# Patient Record
Sex: MALE | Race: WHITE | NOT HISPANIC OR LATINO | Employment: OTHER | URBAN - METROPOLITAN AREA
[De-identification: names, ages, dates, MRNs, and addresses within clinical notes are randomized per-mention and may not be internally consistent; named-entity substitution may affect disease eponyms.]

---

## 2021-11-10 ENCOUNTER — APPOINTMENT (OUTPATIENT)
Dept: URGENT CARE | Facility: CLINIC | Age: 81
End: 2021-11-10
Payer: COMMERCIAL

## 2021-11-11 ENCOUNTER — HOSPITAL ENCOUNTER (OUTPATIENT)
Dept: RADIOLOGY | Facility: MEDICAL CENTER | Age: 81
End: 2021-11-11
Attending: NURSE PRACTITIONER
Payer: MEDICARE

## 2021-11-11 ENCOUNTER — OFFICE VISIT (OUTPATIENT)
Dept: URGENT CARE | Facility: CLINIC | Age: 81
End: 2021-11-11
Payer: MEDICARE

## 2021-11-11 VITALS
HEART RATE: 110 BPM | BODY MASS INDEX: 25.95 KG/M2 | TEMPERATURE: 97.6 F | RESPIRATION RATE: 16 BRPM | OXYGEN SATURATION: 97 % | DIASTOLIC BLOOD PRESSURE: 82 MMHG | WEIGHT: 191.6 LBS | SYSTOLIC BLOOD PRESSURE: 144 MMHG | HEIGHT: 72 IN

## 2021-11-11 DIAGNOSIS — R10.31 RIGHT INGUINAL PAIN: ICD-10-CM

## 2021-11-11 PROBLEM — E78.2 MIXED HYPERLIPIDEMIA: Status: ACTIVE | Noted: 2019-04-04

## 2021-11-11 PROBLEM — M54.17 RIGHT LUMBOSACRAL RADICULOPATHY: Status: ACTIVE | Noted: 2019-09-05

## 2021-11-11 PROBLEM — R19.5 POSITIVE COLORECTAL CANCER SCREENING USING COLOGUARD TEST: Status: ACTIVE | Noted: 2021-02-17

## 2021-11-11 PROBLEM — G60.8 PERIPHERAL SENSORY-MOTOR AXONAL POLYNEUROPATHY: Status: ACTIVE | Noted: 2019-09-05

## 2021-11-11 PROBLEM — I82.5Z9 CHRONIC DEEP VEIN THROMBOSIS (DVT) OF DISTAL VEIN OF LOWER EXTREMITY (HCC): Status: ACTIVE | Noted: 2019-04-04

## 2021-11-11 PROBLEM — N21.0 BLADDER CALCULUS: Status: ACTIVE | Noted: 2021-05-17

## 2021-11-11 LAB
APPEARANCE UR: CLEAR
BILIRUB UR STRIP-MCNC: NEGATIVE MG/DL
COLOR UR AUTO: YELLOW
GLUCOSE UR STRIP.AUTO-MCNC: NEGATIVE MG/DL
KETONES UR STRIP.AUTO-MCNC: NEGATIVE MG/DL
LEUKOCYTE ESTERASE UR QL STRIP.AUTO: NEGATIVE
NITRITE UR QL STRIP.AUTO: NEGATIVE
PH UR STRIP.AUTO: 5 [PH] (ref 5–8)
PROT UR QL STRIP: NEGATIVE MG/DL
RBC UR QL AUTO: NORMAL
SP GR UR STRIP.AUTO: 1.03
UROBILINOGEN UR STRIP-MCNC: 0.2 MG/DL

## 2021-11-11 PROCEDURE — 76775 US EXAM ABDO BACK WALL LIM: CPT

## 2021-11-11 PROCEDURE — 76857 US EXAM PELVIC LIMITED: CPT

## 2021-11-11 PROCEDURE — 99203 OFFICE O/P NEW LOW 30 MIN: CPT | Performed by: NURSE PRACTITIONER

## 2021-11-11 PROCEDURE — 81002 URINALYSIS NONAUTO W/O SCOPE: CPT | Performed by: NURSE PRACTITIONER

## 2021-11-11 PROCEDURE — 76870 US EXAM SCROTUM: CPT

## 2021-11-11 RX ORDER — OMEPRAZOLE 10 MG/1
10 CAPSULE, DELAYED RELEASE ORAL
COMMUNITY
End: 2021-11-23

## 2021-11-11 RX ORDER — APIXABAN 2.5 MG/1
2.5 TABLET, FILM COATED ORAL 2 TIMES DAILY
COMMUNITY
Start: 2021-08-25

## 2021-11-11 RX ORDER — PITAVASTATIN CALCIUM 4.18 MG/1
4 TABLET, FILM COATED ORAL DAILY
COMMUNITY

## 2021-11-11 RX ORDER — TAMSULOSIN HYDROCHLORIDE 0.4 MG/1
0.4 CAPSULE ORAL EVERY EVENING
COMMUNITY

## 2021-11-11 NOTE — PROGRESS NOTES
Chief Complaint   Patient presents with   • Groin Pain     (R) side, x1 month        HISTORY OF PRESENT ILLNESS: Patient is a pleasant 81 y.o. male with a history of inguinal hernia, bladder calculus, and kidney stone who presents to urgent care today with complaints of right groin pain for the past 2 months.  The pain occurs with activity, improves with rest.  He denies any urinary symptoms, fever, chills, malaise, testicular pain or swelling, changes in his bowel movements.  He describes the pain is similar to his previous inguinal hernia.  Reports surgical repair in mid 1990s for such.  He has not tried any medication for symptom relief as rest typically improves symptoms.  He is visiting from [place], will be here for approximately 1 month.    Patient Active Problem List    Diagnosis Date Noted   • Bladder calculus 05/17/2021   • Positive colorectal cancer screening using Cologuard test 02/17/2021   • Right lumbosacral radiculopathy 09/05/2019   • Peripheral sensory-motor axonal polyneuropathy 09/05/2019   • Chronic deep vein thrombosis (DVT) of distal vein of lower extremity ([facility]) 04/04/2019   • Mixed hyperlipidemia 04/04/2019   • Gastrocnemius muscle strain, left, initial encounter 05/18/2016   • BPH (benign prostatic hyperplasia) 01/16/2016   • DVT, recurrent, lower extremity, chronic 01/16/2016   • Hyperlipidemia 01/16/2016   • DVT (deep venous thrombosis) ([facility]) 07/10/2014   • Hypercholesteremia 07/10/2014   • Intestinal metaplasia of gastric mucosa 07/10/2014   • Calculus of kidney 05/08/2014   • 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     •  Pitavastatin Calcium 4 MG Tab Take 2 mg by mouth.     • Multiple Vitamin (MULTI-VITAMIN) Tab Take 1 Tablet by mouth.     • omeprazole (PRILOSEC) 10 MG CAPSULE DELAYED RELEASE Take 10 mg by mouth.       No current Epic-ordered facility-administered medications on file.       History reviewed. No pertinent past medical history.    Social History     Tobacco Use   • Smoking status: Not on file   • Smokeless tobacco: Not on file   Substance Use Topics   • Alcohol use: Not on file   • Drug use: Not on file       No family status information on file.   History reviewed. No pertinent family history.    ROS:  Review of Systems   Constitutional: Negative for fever, chills, weight loss, malaise, and fatigue.   HENT: Negative for ear pain, nosebleeds, congestion, sore throat and neck pain.    Eyes: Negative for vision changes.   Neuro: Negative for headache, sensory changes, weakness, seizure, LOC.   Cardiovascular: Negative for chest pain, palpitations, orthopnea and leg swelling.   Respiratory: Negative for cough, sputum production, shortness of breath and wheezing.   Gastrointestinal: Negative for abdominal pain, nausea, vomiting or diarrhea.   Genitourinary: Positive for groin pain.  Negative for dysuria, urgency and frequency.  Musculoskeletal: Negative for falls, neck pain, back pain, joint pain, myalgias.   Skin: Negative for rash, diaphoresis.     Exam:  /82 (BP Location: Right arm, Patient Position: Sitting, BP Cuff Size: Adult long)   Pulse (!) 110   Temp 36.4 °C (97.6 °F) (Temporal)   Resp 16   Ht 1.829 m (6')   Wt 86.9 kg (191 lb 9.6 oz)   SpO2 97%   General: well-nourished, well-developed male in NAD  Head: normocephalic, atraumatic  Eyes: PERRLA, no conjunctival injection, acuity grossly intact, lids normal.  Ears: normal shape and symmetry, no tenderness, no discharge. External canals are without any significant edema or erythema. Tympanic membranes are without any inflammation, no effusion. Gross  auditory acuity is intact.  Nose: symmetrical without tenderness, no discharge.  Mouth/Throat: reasonable hygiene, no erythema, exudates or tonsillar enlargement.  Neck: no masses, range of motion within normal limits, no tracheal deviation. No obvious thyroid enlargement.   Lymph: no cervical adenopathy. No supraclavicular adenopathy.   Neuro: alert and oriented. Cranial nerves 1-12 grossly intact. No sensory deficit.   Cardiovascular: regular rate and rhythm. No edema.  Pulmonary: no distress. Chest is symmetrical with respiration, no wheezes, crackles, or rhonchi.   Abdomen: soft, non-tender, no guarding, no hepatosplenomegaly.  No groin tenderness.  Musculoskeletal: no clubbing, appropriate muscle tone, gait is stable.  Skin: warm, dry, intact, no clubbing, no cyanosis, no rashes.   Psych: appropriate mood, affect, judgement.     POC urine positive for blood      Assessment/Plan:  1. Right inguinal pain  POCT Urinalysis    US-RENAL    US-INGUINAL HERNIA    [id number]       Patient presents with ongoing right groin pain.  Urine is positive for blood, patient notes that this is somewhat chronic for him.  I have ordered a renal, hernia, and scrotal ultrasound and patient, will call with results.  May use OTC Tylenol if needed.  Supportive care, differential diagnoses, and indications for immediate follow-up discussed with patient.   Pathogenesis of diagnosis discussed including typical length and natural progression.   Instructed to return to clinic or nearest emergency department for any change in condition, further concerns, or worsening of symptoms.  Patient states understanding of the plan of care and discharge instructions.  Instructed to make an appointment, for follow up, with his primary care provider.        Please note that this dictation was created using voice recognition software. I have made every reasonable attempt to correct obvious errors, but I expect that there are errors of grammar and  possibly content that I did not discover before finalizing the note.      NEIDA Castro.

## 2021-11-12 DIAGNOSIS — K40.90 UNILATERAL INGUINAL HERNIA WITHOUT OBSTRUCTION OR GANGRENE, RECURRENCE NOT SPECIFIED: ICD-10-CM

## 2021-11-23 ENCOUNTER — PRE-ADMISSION TESTING (OUTPATIENT)
Dept: ADMISSIONS | Facility: MEDICAL CENTER | Age: 81
End: 2021-11-23
Attending: COLON & RECTAL SURGERY
Payer: MEDICARE

## 2021-11-23 DIAGNOSIS — Z01.812 PRE-OPERATIVE LABORATORY EXAMINATION: ICD-10-CM

## 2021-11-23 DIAGNOSIS — Z01.810 PRE-OPERATIVE CARDIOVASCULAR EXAMINATION: ICD-10-CM

## 2021-11-23 LAB
ANION GAP SERPL CALC-SCNC: 10 MMOL/L (ref 7–16)
BUN SERPL-MCNC: 25 MG/DL (ref 8–22)
CALCIUM SERPL-MCNC: 9.3 MG/DL (ref 8.5–10.5)
CHLORIDE SERPL-SCNC: 105 MMOL/L (ref 96–112)
CO2 SERPL-SCNC: 26 MMOL/L (ref 20–33)
CREAT SERPL-MCNC: 0.96 MG/DL (ref 0.5–1.4)
EKG IMPRESSION: NORMAL
ERYTHROCYTE [DISTWIDTH] IN BLOOD BY AUTOMATED COUNT: 43 FL (ref 35.9–50)
GLUCOSE SERPL-MCNC: 88 MG/DL (ref 65–99)
HCT VFR BLD AUTO: 45.3 % (ref 42–52)
HGB BLD-MCNC: 15.3 G/DL (ref 14–18)
MCH RBC QN AUTO: 31.5 PG (ref 27–33)
MCHC RBC AUTO-ENTMCNC: 33.8 G/DL (ref 33.7–35.3)
MCV RBC AUTO: 93.4 FL (ref 81.4–97.8)
PLATELET # BLD AUTO: 204 K/UL (ref 164–446)
PMV BLD AUTO: 8.7 FL (ref 9–12.9)
POTASSIUM SERPL-SCNC: 4.5 MMOL/L (ref 3.6–5.5)
RBC # BLD AUTO: 4.85 M/UL (ref 4.7–6.1)
SARS-COV+SARS-COV-2 AG RESP QL IA.RAPID: NOTDETECTED
SODIUM SERPL-SCNC: 141 MMOL/L (ref 135–145)
SPECIMEN SOURCE: NORMAL
WBC # BLD AUTO: 8.8 K/UL (ref 4.8–10.8)

## 2021-11-23 PROCEDURE — 80048 BASIC METABOLIC PNL TOTAL CA: CPT

## 2021-11-23 PROCEDURE — 93010 ELECTROCARDIOGRAM REPORT: CPT | Performed by: INTERNAL MEDICINE

## 2021-11-23 PROCEDURE — 93005 ELECTROCARDIOGRAM TRACING: CPT

## 2021-11-23 PROCEDURE — 87426 SARSCOV CORONAVIRUS AG IA: CPT

## 2021-11-23 PROCEDURE — 36415 COLL VENOUS BLD VENIPUNCTURE: CPT

## 2021-11-23 PROCEDURE — 85027 COMPLETE CBC AUTOMATED: CPT

## 2021-11-24 ENCOUNTER — ANESTHESIA EVENT (OUTPATIENT)
Dept: SURGERY | Facility: MEDICAL CENTER | Age: 81
End: 2021-11-24
Payer: MEDICARE

## 2021-11-24 ENCOUNTER — HOSPITAL ENCOUNTER (OUTPATIENT)
Facility: MEDICAL CENTER | Age: 81
End: 2021-11-24
Attending: COLON & RECTAL SURGERY | Admitting: COLON & RECTAL SURGERY
Payer: MEDICARE

## 2021-11-24 ENCOUNTER — ANESTHESIA (OUTPATIENT)
Dept: SURGERY | Facility: MEDICAL CENTER | Age: 81
End: 2021-11-24
Payer: MEDICARE

## 2021-11-24 VITALS
HEART RATE: 76 BPM | SYSTOLIC BLOOD PRESSURE: 135 MMHG | RESPIRATION RATE: 20 BRPM | DIASTOLIC BLOOD PRESSURE: 97 MMHG | TEMPERATURE: 96.7 F | OXYGEN SATURATION: 92 % | BODY MASS INDEX: 25.29 KG/M2 | WEIGHT: 186.73 LBS | HEIGHT: 72 IN

## 2021-11-24 DIAGNOSIS — Z87.19 H/O INGUINAL HERNIA REPAIR: ICD-10-CM

## 2021-11-24 DIAGNOSIS — G89.18 POST-OPERATIVE PAIN: ICD-10-CM

## 2021-11-24 DIAGNOSIS — Z98.890 H/O INGUINAL HERNIA REPAIR: ICD-10-CM

## 2021-11-24 PROCEDURE — C1781 MESH (IMPLANTABLE): HCPCS | Performed by: COLON & RECTAL SURGERY

## 2021-11-24 PROCEDURE — 501568 HCHG TROCAR, BLUNTPORT 12MM: Performed by: COLON & RECTAL SURGERY

## 2021-11-24 PROCEDURE — 160002 HCHG RECOVERY MINUTES (STAT): Performed by: COLON & RECTAL SURGERY

## 2021-11-24 PROCEDURE — 160035 HCHG PACU - 1ST 60 MINS PHASE I: Performed by: COLON & RECTAL SURGERY

## 2021-11-24 PROCEDURE — 501570 HCHG TROCAR, SEPARATOR: Performed by: COLON & RECTAL SURGERY

## 2021-11-24 PROCEDURE — 501838 HCHG SUTURE GENERAL: Performed by: COLON & RECTAL SURGERY

## 2021-11-24 PROCEDURE — 501338 HCHG SHEARS, ENDO: Performed by: COLON & RECTAL SURGERY

## 2021-11-24 PROCEDURE — 700105 HCHG RX REV CODE 258: Performed by: COLON & RECTAL SURGERY

## 2021-11-24 PROCEDURE — 700111 HCHG RX REV CODE 636 W/ 250 OVERRIDE (IP): Performed by: ANESTHESIOLOGY

## 2021-11-24 PROCEDURE — 160046 HCHG PACU - 1ST 60 MINS PHASE II: Performed by: COLON & RECTAL SURGERY

## 2021-11-24 PROCEDURE — 700111 HCHG RX REV CODE 636 W/ 250 OVERRIDE (IP): Performed by: COLON & RECTAL SURGERY

## 2021-11-24 PROCEDURE — 160048 HCHG OR STATISTICAL LEVEL 1-5: Performed by: COLON & RECTAL SURGERY

## 2021-11-24 PROCEDURE — 502240 HCHG MISC OR SUPPLY RC 0272: Performed by: COLON & RECTAL SURGERY

## 2021-11-24 PROCEDURE — 502571 HCHG PACK, LAP CHOLE: Performed by: COLON & RECTAL SURGERY

## 2021-11-24 PROCEDURE — 160025 RECOVERY II MINUTES (STATS): Performed by: COLON & RECTAL SURGERY

## 2021-11-24 PROCEDURE — 160029 HCHG SURGERY MINUTES - 1ST 30 MINS LEVEL 4: Performed by: COLON & RECTAL SURGERY

## 2021-11-24 PROCEDURE — 160036 HCHG PACU - EA ADDL 30 MINS PHASE I: Performed by: COLON & RECTAL SURGERY

## 2021-11-24 PROCEDURE — 160009 HCHG ANES TIME/MIN: Performed by: COLON & RECTAL SURGERY

## 2021-11-24 PROCEDURE — 501583 HCHG TROCAR, THRD CAN&SEAL 5X100: Performed by: COLON & RECTAL SURGERY

## 2021-11-24 PROCEDURE — 160041 HCHG SURGERY MINUTES - EA ADDL 1 MIN LEVEL 4: Performed by: COLON & RECTAL SURGERY

## 2021-11-24 PROCEDURE — 700101 HCHG RX REV CODE 250: Performed by: ANESTHESIOLOGY

## 2021-11-24 PROCEDURE — 700101 HCHG RX REV CODE 250: Performed by: COLON & RECTAL SURGERY

## 2021-11-24 DEVICE — MESH 3D MAX RIGHT 10.8 X 16CM - MED(1/CA): Type: IMPLANTABLE DEVICE | Site: PELVIS | Status: FUNCTIONAL

## 2021-11-24 RX ORDER — ONDANSETRON 2 MG/ML
INJECTION INTRAMUSCULAR; INTRAVENOUS PRN
Status: DISCONTINUED | OUTPATIENT
Start: 2021-11-24 | End: 2021-11-24 | Stop reason: SURG

## 2021-11-24 RX ORDER — SODIUM CHLORIDE, SODIUM LACTATE, POTASSIUM CHLORIDE, CALCIUM CHLORIDE 600; 310; 30; 20 MG/100ML; MG/100ML; MG/100ML; MG/100ML
INJECTION, SOLUTION INTRAVENOUS CONTINUOUS
Status: DISCONTINUED | OUTPATIENT
Start: 2021-11-24 | End: 2021-11-24 | Stop reason: HOSPADM

## 2021-11-24 RX ORDER — HALOPERIDOL 5 MG/ML
1 INJECTION INTRAMUSCULAR
Status: DISCONTINUED | OUTPATIENT
Start: 2021-11-24 | End: 2021-11-24 | Stop reason: HOSPADM

## 2021-11-24 RX ORDER — SODIUM CHLORIDE, SODIUM LACTATE, POTASSIUM CHLORIDE, CALCIUM CHLORIDE 600; 310; 30; 20 MG/100ML; MG/100ML; MG/100ML; MG/100ML
INJECTION, SOLUTION INTRAVENOUS CONTINUOUS
Status: ACTIVE | OUTPATIENT
Start: 2021-11-24 | End: 2021-11-24

## 2021-11-24 RX ORDER — HYDRALAZINE HYDROCHLORIDE 20 MG/ML
5 INJECTION INTRAMUSCULAR; INTRAVENOUS
Status: DISCONTINUED | OUTPATIENT
Start: 2021-11-24 | End: 2021-11-24 | Stop reason: HOSPADM

## 2021-11-24 RX ORDER — MIDAZOLAM HYDROCHLORIDE 1 MG/ML
1 INJECTION INTRAMUSCULAR; INTRAVENOUS
Status: DISCONTINUED | OUTPATIENT
Start: 2021-11-24 | End: 2021-11-24 | Stop reason: HOSPADM

## 2021-11-24 RX ORDER — LABETALOL HYDROCHLORIDE 5 MG/ML
5 INJECTION, SOLUTION INTRAVENOUS
Status: DISCONTINUED | OUTPATIENT
Start: 2021-11-24 | End: 2021-11-24 | Stop reason: HOSPADM

## 2021-11-24 RX ORDER — BUPIVACAINE HYDROCHLORIDE AND EPINEPHRINE 2.5; 5 MG/ML; UG/ML
INJECTION, SOLUTION EPIDURAL; INFILTRATION; INTRACAUDAL; PERINEURAL
Status: DISCONTINUED | OUTPATIENT
Start: 2021-11-24 | End: 2021-11-24 | Stop reason: HOSPADM

## 2021-11-24 RX ORDER — MEPERIDINE HYDROCHLORIDE 25 MG/ML
12.5 INJECTION INTRAMUSCULAR; INTRAVENOUS; SUBCUTANEOUS
Status: DISCONTINUED | OUTPATIENT
Start: 2021-11-24 | End: 2021-11-24 | Stop reason: HOSPADM

## 2021-11-24 RX ORDER — CEFAZOLIN SODIUM 1 G/3ML
INJECTION, POWDER, FOR SOLUTION INTRAMUSCULAR; INTRAVENOUS
Status: DISCONTINUED | OUTPATIENT
Start: 2021-11-24 | End: 2021-11-24 | Stop reason: HOSPADM

## 2021-11-24 RX ORDER — CEFAZOLIN SODIUM 1 G/3ML
INJECTION, POWDER, FOR SOLUTION INTRAMUSCULAR; INTRAVENOUS PRN
Status: DISCONTINUED | OUTPATIENT
Start: 2021-11-24 | End: 2021-11-24 | Stop reason: SURG

## 2021-11-24 RX ORDER — HYDROMORPHONE HYDROCHLORIDE 1 MG/ML
0.4 INJECTION, SOLUTION INTRAMUSCULAR; INTRAVENOUS; SUBCUTANEOUS
Status: DISCONTINUED | OUTPATIENT
Start: 2021-11-24 | End: 2021-11-24 | Stop reason: HOSPADM

## 2021-11-24 RX ORDER — DIPHENHYDRAMINE HYDROCHLORIDE 50 MG/ML
12.5 INJECTION INTRAMUSCULAR; INTRAVENOUS
Status: DISCONTINUED | OUTPATIENT
Start: 2021-11-24 | End: 2021-11-24 | Stop reason: HOSPADM

## 2021-11-24 RX ORDER — ROCURONIUM BROMIDE 10 MG/ML
INJECTION, SOLUTION INTRAVENOUS PRN
Status: DISCONTINUED | OUTPATIENT
Start: 2021-11-24 | End: 2021-11-24 | Stop reason: SURG

## 2021-11-24 RX ORDER — ONDANSETRON 4 MG/1
4 TABLET, ORALLY DISINTEGRATING ORAL EVERY 6 HOURS PRN
Qty: 10 TABLET | Refills: 0 | Status: SHIPPED | OUTPATIENT
Start: 2021-11-24 | End: 2022-11-11

## 2021-11-24 RX ORDER — AMOXICILLIN 500 MG/1
500 CAPSULE ORAL 3 TIMES DAILY
Qty: 15 CAPSULE | Refills: 0 | Status: SHIPPED | OUTPATIENT
Start: 2021-11-24 | End: 2021-11-29

## 2021-11-24 RX ORDER — DEXAMETHASONE SODIUM PHOSPHATE 4 MG/ML
INJECTION, SOLUTION INTRA-ARTICULAR; INTRALESIONAL; INTRAMUSCULAR; INTRAVENOUS; SOFT TISSUE PRN
Status: DISCONTINUED | OUTPATIENT
Start: 2021-11-24 | End: 2021-11-24 | Stop reason: SURG

## 2021-11-24 RX ORDER — HYDROMORPHONE HYDROCHLORIDE 1 MG/ML
0.1 INJECTION, SOLUTION INTRAMUSCULAR; INTRAVENOUS; SUBCUTANEOUS
Status: DISCONTINUED | OUTPATIENT
Start: 2021-11-24 | End: 2021-11-24 | Stop reason: HOSPADM

## 2021-11-24 RX ORDER — KETOROLAC TROMETHAMINE 30 MG/ML
INJECTION, SOLUTION INTRAMUSCULAR; INTRAVENOUS PRN
Status: DISCONTINUED | OUTPATIENT
Start: 2021-11-24 | End: 2021-11-24 | Stop reason: SURG

## 2021-11-24 RX ORDER — LIDOCAINE HYDROCHLORIDE 20 MG/ML
INJECTION, SOLUTION EPIDURAL; INFILTRATION; INTRACAUDAL; PERINEURAL PRN
Status: DISCONTINUED | OUTPATIENT
Start: 2021-11-24 | End: 2021-11-24 | Stop reason: SURG

## 2021-11-24 RX ORDER — ONDANSETRON 2 MG/ML
4 INJECTION INTRAMUSCULAR; INTRAVENOUS
Status: DISCONTINUED | OUTPATIENT
Start: 2021-11-24 | End: 2021-11-24 | Stop reason: HOSPADM

## 2021-11-24 RX ORDER — HYDROCODONE BITARTRATE AND ACETAMINOPHEN 5; 325 MG/1; MG/1
1 TABLET ORAL EVERY 4 HOURS PRN
Qty: 30 TABLET | Refills: 0 | Status: SHIPPED | OUTPATIENT
Start: 2021-11-24 | End: 2021-11-29

## 2021-11-24 RX ORDER — HYDROMORPHONE HYDROCHLORIDE 1 MG/ML
0.2 INJECTION, SOLUTION INTRAMUSCULAR; INTRAVENOUS; SUBCUTANEOUS
Status: DISCONTINUED | OUTPATIENT
Start: 2021-11-24 | End: 2021-11-24 | Stop reason: HOSPADM

## 2021-11-24 RX ADMIN — SUGAMMADEX 200 MG: 100 INJECTION, SOLUTION INTRAVENOUS at 11:19

## 2021-11-24 RX ADMIN — CEFAZOLIN 2.6 G: 330 INJECTION, POWDER, FOR SOLUTION INTRAMUSCULAR; INTRAVENOUS at 10:54

## 2021-11-24 RX ADMIN — PROPOFOL 180 MG: 10 INJECTION, EMULSION INTRAVENOUS at 10:48

## 2021-11-24 RX ADMIN — LIDOCAINE HYDROCHLORIDE 50 MG: 20 INJECTION, SOLUTION EPIDURAL; INFILTRATION; INTRACAUDAL at 10:48

## 2021-11-24 RX ADMIN — ONDANSETRON 4 MG: 2 INJECTION INTRAMUSCULAR; INTRAVENOUS at 11:19

## 2021-11-24 RX ADMIN — ROCURONIUM BROMIDE 50 MG: 10 INJECTION, SOLUTION INTRAVENOUS at 10:48

## 2021-11-24 RX ADMIN — MEPERIDINE HYDROCHLORIDE 12.5 MG: 25 INJECTION INTRAMUSCULAR; INTRAVENOUS; SUBCUTANEOUS at 12:21

## 2021-11-24 RX ADMIN — LIDOCAINE HYDROCHLORIDE 0.5 ML: 10 INJECTION, SOLUTION EPIDURAL; INFILTRATION; INTRACAUDAL; PERINEURAL at 08:52

## 2021-11-24 RX ADMIN — SODIUM CHLORIDE, POTASSIUM CHLORIDE, SODIUM LACTATE AND CALCIUM CHLORIDE: 600; 310; 30; 20 INJECTION, SOLUTION INTRAVENOUS at 08:52

## 2021-11-24 RX ADMIN — MEPERIDINE HYDROCHLORIDE 12.5 MG: 25 INJECTION INTRAMUSCULAR; INTRAVENOUS; SUBCUTANEOUS at 11:51

## 2021-11-24 RX ADMIN — FENTANYL CITRATE 50 MCG: 50 INJECTION, SOLUTION INTRAMUSCULAR; INTRAVENOUS at 11:48

## 2021-11-24 RX ADMIN — KETOROLAC TROMETHAMINE 30 MG: 30 INJECTION, SOLUTION INTRAMUSCULAR at 11:19

## 2021-11-24 RX ADMIN — FENTANYL CITRATE 100 MCG: 50 INJECTION, SOLUTION INTRAMUSCULAR; INTRAVENOUS at 10:48

## 2021-11-24 RX ADMIN — DEXAMETHASONE SODIUM PHOSPHATE 8 MG: 4 INJECTION, SOLUTION INTRA-ARTICULAR; INTRALESIONAL; INTRAMUSCULAR; INTRAVENOUS; SOFT TISSUE at 10:59

## 2021-11-24 ASSESSMENT — PAIN DESCRIPTION - PAIN TYPE
TYPE: SURGICAL PAIN

## 2021-11-24 ASSESSMENT — PAIN SCALES - GENERAL: PAIN_LEVEL: 3

## 2021-11-24 NOTE — ANESTHESIA PROCEDURE NOTES
Airway    Date/Time: 11/24/2021 10:49 AM  Performed by: Oz Szymanski M.D.  Authorized by: Oz Szymanski M.D.     Location:  OR  Urgency:  Elective  Difficult Airway: No    Indications for Airway Management:  Anesthesia      Spontaneous Ventilation: absent    Sedation Level:  Deep  Preoxygenated: Yes    Patient Position:  Sniffing  Final Airway Type:  Endotracheal airway  Final Endotracheal Airway:  ETT  Cuffed: Yes    Technique Used for Successful ETT Placement:  Direct laryngoscopy    Insertion Site:  Oral  Blade Type:  Joey  Laryngoscope Blade/Videolaryngoscope Blade Size:  3  ETT Size (mm):  8.0  Measured from:  Lips  ETT to Lips (cm):  24  Placement Verified by: auscultation and capnometry    Cormack-Lehane Classification:  Grade IIa - partial view of glottis  Number of Attempts at Approach:  1  Number of Other Approaches Attempted:  0

## 2021-11-24 NOTE — OP REPORT
NAME:  Bob Schreiber  MRN:  [id number]  :  1940      DATE OF OPERATION: 2021    PREOPERATIVE DIAGNOSIS: right Inguinal Hernia    POSTOPERATIVE DIAGNOSIS: right Inguinal Hernia    OPERATION PERFORMED: Laparoscopic repair of right Inguinal Hernia with Mesh    SURGEON: Hermelindo Mcneil MD    ASSISTANT:  Nadine Arnett PA-C, PA-C    ANESTHESIOLOGIST:  Anesthesiologist: Oz Szymanski M.D.    ANESTHESIA: General endotracheal anesthesia.      SPECIMEN: None    ESTIMATED BLOOD LOSS: < 5cc.     INDICATIONS: The patient is a 81 y.o. male with a diagnosis of right groin bulge with right inguinal hernia. He is taken to the operating room today for laparoscopic repair of inguinal hernia with mesh.     PROCEDURE: Following informed consent, the patient was properly identified, taken to the operating room, and placed in the supine position where general endotracheal anesthesia was administered. Intravenous antibiotics were administered by the anesthesiologist in the correct time interval. Sequential compression devices were employed. The abdomen was prepped and draped into a sterile field.     A small infraumbilical skin incision was made and dissection was carried to the right of midline.  The anterior rectus abdominus fascia sheath was exposed and incised.  The preperitoneal plane was developed bluntly.  The [employer] dissecting trocar balloon instrument was then introduced into the preperitoneal space.  The hand pump was then used to create the preperitoneal dissection.    The balloon trocar was then removed and the CO2 insufflation and optical trocar was then advanced.  The CO2 insufflation was started and the laparoscope was introduced. This demonstrated a nice preperitoneal dissection.    Two additional 5mm trocars were then placed at midline just below the umbilicus.  Blunt dissectors were used to then dissect the right inguinal floor.  A moderately large large sized indirect inguinal hernia was present  with the hernia sac extending up through the inguinal canal.  The peritoneum was slowly reduced and gradually .  The cord lipoma was also reduced.  The vessels and structures were carefully protected and a nice inguinal floor dissection was accomplished.    The right contour mesh was soaked in Kantrex solution and was trimmed so that there was a generous keyhole.  The mesh was then rolled and introduced into the preperitoneal space.  It was then maneuvered into position.  The mesh was then secured to the periosteum along Yoan's ligament and the pubic ramus with the absorbable tacking instrument.  It had very nice position obliterating the hernia defect without constriction of any structures.            The mesh was then held into place with the pneumoperitoneum allowed to escape.  The port were then removed under direct vision.  The port sites were then irrigated well.  The fascia was closed with O Vicryl suture.  The port site skin incisions were closed with interrupted 4-0 Vicryl subcuticular sutures.  Steri-Strips and Benzoin were applied beneath sterile Band-Aids.     The patient tolerated the procedure well and there were no apparent complications. All sponge, needle, and instrument counts were correct on 2 separate occasions. He was awakened, extubated, and transferred to the recovery room in satisfactory condition.       ____________________________________   Hermelindo Mcneil MD  DD: 11/24/2021  11:44 AM    CC:  [facility];

## 2021-11-24 NOTE — ANESTHESIA POSTPROCEDURE EVALUATION
Patient: Bob Schreiber    Procedure Summary     Date: 11/24/21 Room / Location: [place] [id number] / SURGERY [facility]    Anesthesia Start: 1045 Anesthesia Stop: 1128    Procedure: REPAIR, HERNIA, INGUINAL, LAPAROSCOPIC - RECURRENT WITH MESH (Right Pelvis) Diagnosis: (RECURRENT RIGHT INGUINAL HERNIA)    Surgeons: Hermelindo Mcneil M.D. Responsible Provider: Oz Szymanski M.D.    Anesthesia Type: general ASA Status: 3          Final Anesthesia Type: general  Last vitals  BP   Blood Pressure : (!) 162/78    Temp   36.1 °C (97 °F)    Pulse   70   Resp   16    SpO2   91 %      Anesthesia Post Evaluation    Patient location during evaluation: PACU  Patient participation: complete - patient participated  Level of consciousness: awake and alert  Pain score: 3    Airway patency: patent  Anesthetic complications: no  Cardiovascular status: hemodynamically stable  Respiratory status: acceptable  Hydration status: euvolemic    PONV: none          No complications documented.     Nurse Pain Score: 5 (NPRS)

## 2021-11-24 NOTE — DISCHARGE INSTRUCTIONS
Hernia Repair Discharge Instructions:   1. ACTIVITIES:   Upon discharge from the hospital, the day of surgery it is requested that you do no significant physical activity and limit mental activities, as you have had sedation. The day after surgery, you may resume activities of daily living, but for four weeks, it is recommended that you do no strenuous activities or heavy lifting (greater than 15 pounds).   2. DRIVING:   You may drive whenever you are off pain medications and are able to perform the activities needed to drive, i.e. turning, bending, twisting, etc.   3. WOUND:. It is not unusual for patients to experience swelling and even bruising at the hernia repair site.   4. ICE:   please use ice on the wound to decrease the swelling for the first 24 hours and then discontinue.   5. BATHING:   The dressing can be removed two days after surgery and you may then allow the wound to get wet in a shower as normal, but avoid submersion in water (tub bath) for at least a week.   6. PAIN MEDICATION:   You will be given a prescription for pain medication at discharge. Please take these as directed. It is important to remember not to take medications on an empty stomach as this may cause nausea.   7. BOWEL FUNCTION:   After hernia repair, it is not uncommon for patients to experience constipation. This is due to decreasing activity levels as well as pain medications. You may wish to use a stool softener beginning immediately after surgery, and you may or may not need to use a laxative (Miralax, Milk of Magnesia, Senokot, etc.) as well.          8. CALL IF YOU HAVE:   (1) Fevers to more than 101.00 F, (2) Unusual chest or leg pain, (3) Drainage or fluid from incision that may be foul smelling, increased tenderness or soreness at the wound or the wound edges are no longer together, redness or swelling at the incision site. Please do not hesitate to call with any other questions.   9. APPOINTMENT:   Contact our office at  [phone] for a follow-up appointment in 1 to 2 weeks following your procedure. If you have any additional questions, please do not hesitate to call the office and speak to either myself or the physician on call. Office address:  [address], [address], [place] [address] [facility] [phone]     MILD FLU-LIKE SYMPTOMS ARE NORMAL. YOU MAY EXPERIENCE GENERALIZED MUSCLE ACHES, THROAT IRRITATION, HEADACHE AND/OR SOME NAUSEA.    FOR 24 HOURS DO NOT:  Drive, operate machinery or run household appliances.  Drink beer or alcoholic beverages.   Make important decisions or sign legal documents.    DIET: To avoid nausea, slowly advance diet as tolerated, avoiding spicy or greasy foods for the first day.  Add more substantial food to your diet according to your physician's instructions.  INCREASE FLUIDS AND FIBER TO AVOID CONSTIPATION.    You should CALL YOUR PHYSICIAN if you develop:  Fever greater than 101 degrees F.  Pain not relieved by medication, or persistent nausea or vomiting.  Excessive bleeding (blood soaking through dressing) or unexpected drainage from the wound.  Extreme redness or swelling around the incision site, drainage of pus or foul smelling drainage.  Inability to urinate or empty your bladder within 8 hours.  Problems with breathing or chest pain.    You should call 911 if you develop problems with breathing or chest pain.  If you are unable to contact your doctor or surgical center, you should go to the nearest emergency room or urgent care center.  Physician's telephone #: [phone]    If any questions arise, call your doctor.  If your doctor is not available, please feel free to call the Surgical Center at [phone]. The Contact Center is open Monday through Friday 7AM to 5PM and may speak to a nurse at [phone], or toll free at [phone].     A registered nurse may call you a few days after your surgery to see how you are doing after your  procedure.    MEDICATIONS: Resume taking daily medication.  Take prescribed pain medication with food.  If no medication is prescribed, you may take non-aspirin pain medication if needed.  PAIN MEDICATION CAN BE VERY CONSTIPATING.  Take a stool softener or laxative such as senokot, pericolace, or milk of magnesia if needed.    If your physician has prescribed pain medication that includes Acetaminophen (Tylenol), do not take additional Acetaminophen (Tylenol) while taking the prescribed medication.    Depression / Suicide Risk    As you are discharged from this [facility] facility, it is important to learn how to keep safe from harming yourself.    Recognize the warning signs:  · Abrupt changes in personality, positive or negative- including increase in energy   · Giving away possessions  · Change in eating patterns- significant weight changes-  positive or negative  · Change in sleeping patterns- unable to sleep or sleeping all the time   · Unwillingness or inability to communicate  · Depression  · Unusual sadness, discouragement and loneliness  · Talk of wanting to die  · Neglect of personal appearance   · Rebelliousness- reckless behavior  · Withdrawal from people/activities they love  · Confusion- inability to concentrate     If you or a loved one observes any of these behaviors or has concerns about self-harm, here's what you can do:  · Talk about it- your feelings and reasons for harming yourself  · Remove any means that you might use to hurt yourself (examples: pills, rope, extension cords, firearm)  · Get professional help from the community (Mental Health, Substance Abuse, psychological counseling)  · Do not be alone:Call your Safe Contact- someone whom you trust who will be there for you.  · Call your local CRISIS HOTLINE 821-4813 or 229-244-2861  · Call your local Children's Mobile Crisis Response Team [place] (421) 324-3532 or www.[employer]  · Call the toll free National Suicide  Prevention Hotlines   · National Suicide Prevention Lifeline 030-333-PDTS (2625)  · National Hope Line Network 800-SUICIDE (739-1436)

## 2021-11-24 NOTE — ANESTHESIA PREPROCEDURE EVALUATION
Case: [id number] Date/Time: 11/24/21 1000    Procedure: REPAIR, HERNIA, INGUINAL, LAPAROSCOPIC - RECURRENT WITH MESH (Right )    Pre-op diagnosis: RECURRENT RIGHT INGUINAL HERNIA    Location: [facility] OR 10 / SURGERY [facility]    Surgeons: Hermelindo Mcneil M.D.          Relevant Problems   CARDIAC   (positive) Chronic deep vein thrombosis (DVT) of distal vein of lower extremity (HCC)   (positive) DVT (deep venous thrombosis) (HCC)   (positive) DVT, recurrent, lower extremity, chronic         (positive) Calculus of kidney       Physical Exam    Airway   Mallampati: II  TM distance: >3 FB  Neck ROM: full       Cardiovascular - normal exam  Rhythm: regular  Rate: normal  (-) murmur     Dental - normal exam           Pulmonary - normal exam  Breath sounds clear to auscultation     Abdominal    Neurological - normal exam                 Anesthesia Plan    ASA 3       Plan - general       Airway plan will be ETT          Induction: intravenous    Postoperative Plan: Postoperative administration of opioids is intended.    Pertinent diagnostic labs and testing reviewed    Informed Consent:    Anesthetic plan and risks discussed with patient.    Use of blood products discussed with: patient whom consented to blood products.

## 2021-11-24 NOTE — OR NURSING
1124- patient arrived to pacu.  2 identifiers confirmed.  Patient lethargic but alert.  Report received from anesthesia and OR RN.  3 lap sites on abdomen with bandaid/steri-strip.  All 3 with scant bleeding noted.  Ice pack applied, patient reports minimal pain, but declines pharmacological intervention.  Provided juice, tolerating well.      1210- upper lap site, oozing noted to persist.  bandaid removed, and gauze with tegaderm applied to site.  Two lower sites no new drainage noted.      1313- patient transferred to phase 2/DC area.  Handoff report given.  Patient assisted from gurney to chair.  Observed lap sites with receiving RN.

## 2021-11-24 NOTE — OR NURSING
VSS, pt steady with ambulation, meets discharge criteria. Discharged home with wife. Wheeled to car with hospital escort. E-Rx by physician. Jaron CDI. IV dc'd, cathlon intact. Pt to f/u with physician as directed by physician. Pt to return to ER for any emergent sx. Pt verbalizes understanding of discharge instructions and all questions were answered.

## 2022-11-11 ENCOUNTER — APPOINTMENT (OUTPATIENT)
Dept: RADIOLOGY | Facility: IMAGING CENTER | Age: 82
End: 2022-11-11
Attending: NURSE PRACTITIONER
Payer: MEDICARE

## 2022-11-11 ENCOUNTER — OFFICE VISIT (OUTPATIENT)
Dept: URGENT CARE | Facility: CLINIC | Age: 82
End: 2022-11-11
Payer: MEDICARE

## 2022-11-11 VITALS
WEIGHT: 191.4 LBS | BODY MASS INDEX: 25.92 KG/M2 | HEIGHT: 72 IN | HEART RATE: 98 BPM | SYSTOLIC BLOOD PRESSURE: 152 MMHG | TEMPERATURE: 99.8 F | DIASTOLIC BLOOD PRESSURE: 80 MMHG | OXYGEN SATURATION: 99 % | RESPIRATION RATE: 12 BRPM

## 2022-11-11 DIAGNOSIS — J22 ACUTE LOWER RESPIRATORY TRACT INFECTION: ICD-10-CM

## 2022-11-11 DIAGNOSIS — R05.1 ACUTE COUGH: ICD-10-CM

## 2022-11-11 PROBLEM — K57.00: Status: ACTIVE | Noted: 2022-07-21

## 2022-11-11 PROBLEM — R20.0 NUMBNESS AND TINGLING IN RIGHT HAND: Status: ACTIVE | Noted: 2022-09-02

## 2022-11-11 PROBLEM — M18.11 ARTHRITIS OF CARPOMETACARPAL (CMC) JOINT OF RIGHT THUMB: Status: ACTIVE | Noted: 2022-09-02

## 2022-11-11 PROBLEM — Z86.718 HISTORY OF DEEP VEIN THROMBOSIS OF LOWER EXTREMITY: Status: ACTIVE | Noted: 2022-07-21

## 2022-11-11 PROBLEM — R20.2 NUMBNESS AND TINGLING IN RIGHT HAND: Status: ACTIVE | Noted: 2022-09-02

## 2022-11-11 PROCEDURE — 71046 X-RAY EXAM CHEST 2 VIEWS: CPT | Mod: TC | Performed by: FAMILY MEDICINE

## 2022-11-11 PROCEDURE — 99214 OFFICE O/P EST MOD 30 MIN: CPT | Performed by: NURSE PRACTITIONER

## 2022-11-11 RX ORDER — DOXYCYCLINE HYCLATE 100 MG
100 TABLET ORAL 2 TIMES DAILY
Qty: 20 TABLET | Refills: 0 | Status: SHIPPED | OUTPATIENT
Start: 2022-11-11 | End: 2022-11-21

## 2022-11-11 RX ORDER — BENZONATATE 100 MG/1
100 CAPSULE ORAL 3 TIMES DAILY PRN
Qty: 60 CAPSULE | Refills: 0 | Status: SHIPPED | OUTPATIENT
Start: 2022-11-11 | End: 2023-11-22

## 2022-11-12 NOTE — PROGRESS NOTES
Chief Complaint   Patient presents with    Pharyngitis     Pt has a itchy throat, runny nose, sneezing, cough, chest congestion x 11 days       HISTORY OF PRESENT ILLNESS: Patient is a pleasant 82 y.o. male who presents today due to symptoms which started 11 days ago. Pt reports a cough, nasal congestion, fever, chills, fatigue, malaise, and body aches. Denies chest pain, shortness of breath, or wheezing. Admits to distant h/o CAP. Has tried OTC Tylenol without significant relief of symptoms. No recent ABX use. No other aggravating or alleviating factors. Denies known exposure to COVID-19.       Patient Active Problem List    Diagnosis Date Noted    Arthritis of carpometacarpal (CMC) joint of right thumb 09/02/2022    Numbness and tingling in right hand 09/02/2022    Diverticulitis of small intestine with perforation 07/21/2022    History of deep vein thrombosis of lower extremity 07/21/2022    Bladder calculus 05/17/2021    Positive colorectal cancer screening using Cologuard test 02/17/2021    Right lumbosacral radiculopathy 09/05/2019    Peripheral sensory-motor axonal polyneuropathy 09/05/2019    Chronic deep vein thrombosis (DVT) of distal vein of lower extremity (HCC) 04/04/2019    Mixed hyperlipidemia 04/04/2019    Gastrocnemius muscle strain, left, initial encounter 05/18/2016    BPH (benign prostatic hyperplasia) 01/16/2016    DVT, recurrent, lower extremity, chronic 01/16/2016    Hyperlipidemia 01/16/2016    DVT (deep venous thrombosis) (HCC) 07/10/2014    Hypercholesteremia 07/10/2014    Intestinal metaplasia of gastric mucosa 07/10/2014    Calculus of kidney 05/08/2014    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      ELIQUIS 2.5 MG Tab Take 1 Tablet by mouth 2 times a day.      tamsulosin (FLOMAX) 0.4 MG capsule Take 1 Capsule by mouth every evening.      Multiple Vitamin (MULTI-VITAMIN) Tab Take 2 Tablets by mouth every day.      Pitavastatin Calcium 2 MG Tab Take 2 mg by mouth every day. (Patient not taking: Reported on 11/11/2022)       No current [facility]-ordered facility-administered medications on file.       Past Medical History:   Diagnosis Date    Blood clotting disorder (HCC) 2016    5 DVT's 5-10 years ago    Cataract 11/23/2021    left eye no surgery    Heart burn 11/23/2021    High cholesterol 11/23/2021    Urinary bladder disorder 11/23/2021    BPH       Social History     Tobacco Use    Smoking status: Never    Smokeless tobacco: Never   Vaping Use    Vaping Use: Never used   Substance Use Topics    Alcohol use: Not Currently     Comment: rarely    Drug use: Never       No family status information on file.   History reviewed. No pertinent family history.    ROS:  Review of Systems   Constitutional: Positive for subjective fever, chills, fatigue, malaise. Negative for weight loss.  HENT: Positive for congestion, ear pressure, and sore throat. Negative for ear pain, nosebleeds, and neck pain.    Eyes: Negative for vision changes.   Cardiovascular: Negative for chest pain, palpitations, orthopnea and leg swelling.   Respiratory: Positive for cough and sputum production. Negative for shortness of breath and wheezing.   Gastrointestinal: Negative for abdominal pain, nausea, vomiting or diarrhea.   Skin: Negative for rash, diaphoresis.     Exam:  BP (!) 152/80 (BP Location: Right arm, Patient Position: Sitting, BP Cuff Size: Adult)   Pulse 98   Temp 37.7 °C (99.8 °F) (Temporal)   Resp 12   Ht 1.829 m (6')   Wt 86.8 kg (191 lb 6.4 oz)   SpO2 99%   General: well-nourished, well-developed male in NAD  Head: normocephalic, atraumatic  Eyes: PERRLA, EOM within  "normal limits, no conjunctival injection, no scleral icterus, visual fields and acuity grossly intact.  Ears: normal shape and symmetry, no tenderness, no discharge. External canals are without any significant edema or erythema. Tympanic membranes are without any inflammation, no effusion. Gross auditory acuity is intact.  Nose: symmetrical without tenderness, mild discharge, erythema present bilateral nares.  Mouth/Throat: reasonable hygiene, no exudates or tonsillar enlargement. Mild erythema present.   Neck: no masses, range of motion within normal limits, no tracheal deviation.  Lymph: mild cervical adenopathy. No supraclavicular adenopathy.   Neuro: alert and oriented. Cranial nerves 1-12 grossly intact.   Cardiovascular: regular rate and rhythm without murmurs, rubs, or gallops. No edema.   Pulmonary: no distress. Chest is symmetrical with respiration. No wheezes, crackles. Scattered rhonchi.   Musculoskeletal: appropriate muscle tone, gait is stable.  Skin: warm, dry, intact, no clubbing, no cyanosis.   Psych: appropriate mood, affect, judgement.       DX chest radiology reading \"No active disease.\"        Assessment/Plan:  1. Acute lower respiratory tract infection  doxycycline (VIBRAMYCIN) 100 MG Tab      2. Acute cough  DX-CHEST-2 VIEWS    benzonatate (TESSALON) 100 MG Cap                Patient is a pleasant 82-year-old who presents with respiratory symptoms allow the last 11 days with worsening.  Chest x-ray is negative although will treat with antibiotic therapy due to presentation and examination.  Discussed natural progression and sx care.  Tessalon as needed for cough.  Rest, increase fluids, hand and respiratory hygiene.   Supportive care, differential diagnoses, and indications for immediate follow-up discussed with patient.   Pathogenesis of diagnosis discussed including typical length and natural progression.  Instructed to return to clinic or nearest emergency department for any change in " condition, further concerns, or worsening of symptoms.  Patient states understanding of the plan of care and discharge instructions.  Instructed to make an appointment with his primary care provider in the next 3-5 days if not significantly improving and for further care.         Please note that this dictation was created using voice recognition software. I have made every reasonable attempt to correct obvious errors, but I expect that there are errors of grammar and possibly content that I did not discover before finalizing the note.      NEIDA Castro.

## 2023-11-22 ENCOUNTER — APPOINTMENT (OUTPATIENT)
Dept: ADMISSIONS | Facility: MEDICAL CENTER | Age: 83
End: 2023-11-22
Attending: COLON & RECTAL SURGERY
Payer: MEDICARE

## 2023-11-22 RX ORDER — EZETIMIBE 10 MG/1
10 TABLET ORAL DAILY
COMMUNITY

## 2023-11-28 ENCOUNTER — ANESTHESIA EVENT (OUTPATIENT)
Dept: SURGERY | Facility: MEDICAL CENTER | Age: 83
End: 2023-11-28
Payer: MEDICARE

## 2023-11-29 ENCOUNTER — HOSPITAL ENCOUNTER (OUTPATIENT)
Facility: MEDICAL CENTER | Age: 83
End: 2023-11-29
Attending: COLON & RECTAL SURGERY | Admitting: COLON & RECTAL SURGERY
Payer: MEDICARE

## 2023-11-29 ENCOUNTER — ANESTHESIA (OUTPATIENT)
Dept: SURGERY | Facility: MEDICAL CENTER | Age: 83
End: 2023-11-29
Payer: MEDICARE

## 2023-11-29 VITALS
OXYGEN SATURATION: 96 % | TEMPERATURE: 97 F | WEIGHT: 184.08 LBS | SYSTOLIC BLOOD PRESSURE: 150 MMHG | HEART RATE: 71 BPM | DIASTOLIC BLOOD PRESSURE: 82 MMHG | BODY MASS INDEX: 24.93 KG/M2 | HEIGHT: 72 IN | RESPIRATION RATE: 16 BRPM

## 2023-11-29 DIAGNOSIS — G89.18 POSTOPERATIVE PAIN: ICD-10-CM

## 2023-11-29 LAB — EKG IMPRESSION: NORMAL

## 2023-11-29 PROCEDURE — 700101 HCHG RX REV CODE 250: Performed by: COLON & RECTAL SURGERY

## 2023-11-29 PROCEDURE — 700111 HCHG RX REV CODE 636 W/ 250 OVERRIDE (IP): Performed by: ANESTHESIOLOGY

## 2023-11-29 PROCEDURE — 700105 HCHG RX REV CODE 258: Performed by: COLON & RECTAL SURGERY

## 2023-11-29 PROCEDURE — A9270 NON-COVERED ITEM OR SERVICE: HCPCS | Performed by: ANESTHESIOLOGY

## 2023-11-29 PROCEDURE — C1713 ANCHOR/SCREW BN/BN,TIS/BN: HCPCS | Performed by: COLON & RECTAL SURGERY

## 2023-11-29 PROCEDURE — 160048 HCHG OR STATISTICAL LEVEL 1-5: Performed by: COLON & RECTAL SURGERY

## 2023-11-29 PROCEDURE — 160025 RECOVERY II MINUTES (STATS): Performed by: COLON & RECTAL SURGERY

## 2023-11-29 PROCEDURE — 160009 HCHG ANES TIME/MIN: Performed by: COLON & RECTAL SURGERY

## 2023-11-29 PROCEDURE — C1781 MESH (IMPLANTABLE): HCPCS | Performed by: COLON & RECTAL SURGERY

## 2023-11-29 PROCEDURE — 93005 ELECTROCARDIOGRAM TRACING: CPT | Mod: XE | Performed by: ANESTHESIOLOGY

## 2023-11-29 PROCEDURE — 93010 ELECTROCARDIOGRAM REPORT: CPT | Performed by: INTERNAL MEDICINE

## 2023-11-29 PROCEDURE — 160035 HCHG PACU - 1ST 60 MINS PHASE I: Performed by: COLON & RECTAL SURGERY

## 2023-11-29 PROCEDURE — 160029 HCHG SURGERY MINUTES - 1ST 30 MINS LEVEL 4: Performed by: COLON & RECTAL SURGERY

## 2023-11-29 PROCEDURE — 700101 HCHG RX REV CODE 250: Performed by: ANESTHESIOLOGY

## 2023-11-29 PROCEDURE — 700102 HCHG RX REV CODE 250 W/ 637 OVERRIDE(OP): Performed by: ANESTHESIOLOGY

## 2023-11-29 PROCEDURE — 160036 HCHG PACU - EA ADDL 30 MINS PHASE I: Performed by: COLON & RECTAL SURGERY

## 2023-11-29 PROCEDURE — 160041 HCHG SURGERY MINUTES - EA ADDL 1 MIN LEVEL 4: Performed by: COLON & RECTAL SURGERY

## 2023-11-29 PROCEDURE — 160046 HCHG PACU - 1ST 60 MINS PHASE II: Performed by: COLON & RECTAL SURGERY

## 2023-11-29 PROCEDURE — 160002 HCHG RECOVERY MINUTES (STAT): Performed by: COLON & RECTAL SURGERY

## 2023-11-29 DEVICE — MESH 3D MAX LEFT 8.5 X 13.7CM - MEDIUM (1EA/CA): Type: IMPLANTABLE DEVICE | Site: GROIN | Status: FUNCTIONAL

## 2023-11-29 DEVICE — MESH PERFIX PLUG LIGHT MED - 3.3CM X 3.9CM  (1EA/CA): Type: IMPLANTABLE DEVICE | Site: GROIN | Status: FUNCTIONAL

## 2023-11-29 RX ORDER — ACETAMINOPHEN 500 MG
500 TABLET ORAL 4 TIMES DAILY PRN
COMMUNITY

## 2023-11-29 RX ORDER — MEPERIDINE HYDROCHLORIDE 25 MG/ML
12.5 INJECTION INTRAMUSCULAR; INTRAVENOUS; SUBCUTANEOUS
Status: DISCONTINUED | OUTPATIENT
Start: 2023-11-29 | End: 2023-11-29 | Stop reason: HOSPADM

## 2023-11-29 RX ORDER — ONDANSETRON 2 MG/ML
INJECTION INTRAMUSCULAR; INTRAVENOUS PRN
Status: DISCONTINUED | OUTPATIENT
Start: 2023-11-29 | End: 2023-11-29 | Stop reason: SURG

## 2023-11-29 RX ORDER — PHENYLEPHRINE HCL IN 0.9% NACL 0.5 MG/5ML
SYRINGE (ML) INTRAVENOUS PRN
Status: DISCONTINUED | OUTPATIENT
Start: 2023-11-29 | End: 2023-11-29 | Stop reason: SURG

## 2023-11-29 RX ORDER — CEFAZOLIN SODIUM 1 G/3ML
INJECTION, POWDER, FOR SOLUTION INTRAMUSCULAR; INTRAVENOUS PRN
Status: DISCONTINUED | OUTPATIENT
Start: 2023-11-29 | End: 2023-11-29 | Stop reason: SURG

## 2023-11-29 RX ORDER — ONDANSETRON 2 MG/ML
4 INJECTION INTRAMUSCULAR; INTRAVENOUS
Status: DISCONTINUED | OUTPATIENT
Start: 2023-11-29 | End: 2023-11-29 | Stop reason: HOSPADM

## 2023-11-29 RX ORDER — ACETAMINOPHEN 500 MG
1000 TABLET ORAL ONCE
Status: COMPLETED | OUTPATIENT
Start: 2023-11-29 | End: 2023-11-29

## 2023-11-29 RX ORDER — DEXAMETHASONE SODIUM PHOSPHATE 4 MG/ML
INJECTION, SOLUTION INTRA-ARTICULAR; INTRALESIONAL; INTRAMUSCULAR; INTRAVENOUS; SOFT TISSUE PRN
Status: DISCONTINUED | OUTPATIENT
Start: 2023-11-29 | End: 2023-11-29 | Stop reason: SURG

## 2023-11-29 RX ORDER — HYDROMORPHONE HYDROCHLORIDE 1 MG/ML
0.4 INJECTION, SOLUTION INTRAMUSCULAR; INTRAVENOUS; SUBCUTANEOUS
Status: DISCONTINUED | OUTPATIENT
Start: 2023-11-29 | End: 2023-11-29 | Stop reason: HOSPADM

## 2023-11-29 RX ORDER — ROCURONIUM BROMIDE 10 MG/ML
INJECTION, SOLUTION INTRAVENOUS PRN
Status: DISCONTINUED | OUTPATIENT
Start: 2023-11-29 | End: 2023-11-29 | Stop reason: SURG

## 2023-11-29 RX ORDER — ONDANSETRON 4 MG/1
4 TABLET, ORALLY DISINTEGRATING ORAL EVERY 6 HOURS PRN
Qty: 12 TABLET | Refills: 0 | Status: SHIPPED | OUTPATIENT
Start: 2023-11-29 | End: 2023-12-02

## 2023-11-29 RX ORDER — DIPHENHYDRAMINE HYDROCHLORIDE 50 MG/ML
12.5 INJECTION INTRAMUSCULAR; INTRAVENOUS
Status: DISCONTINUED | OUTPATIENT
Start: 2023-11-29 | End: 2023-11-29 | Stop reason: HOSPADM

## 2023-11-29 RX ORDER — DEXMEDETOMIDINE HYDROCHLORIDE 100 UG/ML
INJECTION, SOLUTION INTRAVENOUS PRN
Status: DISCONTINUED | OUTPATIENT
Start: 2023-11-29 | End: 2023-11-29 | Stop reason: SURG

## 2023-11-29 RX ORDER — EPHEDRINE SULFATE 50 MG/ML
5 INJECTION, SOLUTION INTRAVENOUS
Status: DISCONTINUED | OUTPATIENT
Start: 2023-11-29 | End: 2023-11-29 | Stop reason: HOSPADM

## 2023-11-29 RX ORDER — SODIUM CHLORIDE, SODIUM LACTATE, POTASSIUM CHLORIDE, CALCIUM CHLORIDE 600; 310; 30; 20 MG/100ML; MG/100ML; MG/100ML; MG/100ML
INJECTION, SOLUTION INTRAVENOUS CONTINUOUS
Status: DISCONTINUED | OUTPATIENT
Start: 2023-11-29 | End: 2023-11-29 | Stop reason: HOSPADM

## 2023-11-29 RX ORDER — HYDRALAZINE HYDROCHLORIDE 20 MG/ML
5 INJECTION INTRAMUSCULAR; INTRAVENOUS
Status: DISCONTINUED | OUTPATIENT
Start: 2023-11-29 | End: 2023-11-29 | Stop reason: HOSPADM

## 2023-11-29 RX ORDER — LIDOCAINE HYDROCHLORIDE 20 MG/ML
INJECTION, SOLUTION EPIDURAL; INFILTRATION; INTRACAUDAL; PERINEURAL PRN
Status: DISCONTINUED | OUTPATIENT
Start: 2023-11-29 | End: 2023-11-29 | Stop reason: SURG

## 2023-11-29 RX ORDER — HYDROMORPHONE HYDROCHLORIDE 1 MG/ML
0.1 INJECTION, SOLUTION INTRAMUSCULAR; INTRAVENOUS; SUBCUTANEOUS
Status: DISCONTINUED | OUTPATIENT
Start: 2023-11-29 | End: 2023-11-29 | Stop reason: HOSPADM

## 2023-11-29 RX ORDER — OXYCODONE HYDROCHLORIDE 5 MG/1
5 TABLET ORAL EVERY 4 HOURS PRN
Qty: 18 TABLET | Refills: 0 | Status: SHIPPED | OUTPATIENT
Start: 2023-11-29 | End: 2023-12-02

## 2023-11-29 RX ORDER — IPRATROPIUM BROMIDE AND ALBUTEROL SULFATE 2.5; .5 MG/3ML; MG/3ML
3 SOLUTION RESPIRATORY (INHALATION)
Status: DISCONTINUED | OUTPATIENT
Start: 2023-11-29 | End: 2023-11-29 | Stop reason: HOSPADM

## 2023-11-29 RX ORDER — HYDROMORPHONE HYDROCHLORIDE 1 MG/ML
0.2 INJECTION, SOLUTION INTRAMUSCULAR; INTRAVENOUS; SUBCUTANEOUS
Status: DISCONTINUED | OUTPATIENT
Start: 2023-11-29 | End: 2023-11-29 | Stop reason: HOSPADM

## 2023-11-29 RX ORDER — HALOPERIDOL 5 MG/ML
1 INJECTION INTRAMUSCULAR
Status: DISCONTINUED | OUTPATIENT
Start: 2023-11-29 | End: 2023-11-29 | Stop reason: HOSPADM

## 2023-11-29 RX ORDER — EPHEDRINE SULFATE 50 MG/ML
10 INJECTION, SOLUTION INTRAVENOUS ONCE
Status: DISCONTINUED | OUTPATIENT
Start: 2023-11-29 | End: 2023-11-29 | Stop reason: HOSPADM

## 2023-11-29 RX ORDER — BUPIVACAINE HYDROCHLORIDE AND EPINEPHRINE 2.5; 5 MG/ML; UG/ML
INJECTION, SOLUTION EPIDURAL; INFILTRATION; INTRACAUDAL; PERINEURAL
Status: DISCONTINUED | OUTPATIENT
Start: 2023-11-29 | End: 2023-11-29 | Stop reason: HOSPADM

## 2023-11-29 RX ORDER — SODIUM CHLORIDE, SODIUM LACTATE, POTASSIUM CHLORIDE, CALCIUM CHLORIDE 600; 310; 30; 20 MG/100ML; MG/100ML; MG/100ML; MG/100ML
INJECTION, SOLUTION INTRAVENOUS CONTINUOUS
Status: ACTIVE | OUTPATIENT
Start: 2023-11-29 | End: 2023-11-29

## 2023-11-29 RX ADMIN — ROCURONIUM BROMIDE 50 MG: 50 INJECTION, SOLUTION INTRAVENOUS at 10:34

## 2023-11-29 RX ADMIN — PROPOFOL 150 MG: 10 INJECTION, EMULSION INTRAVENOUS at 10:34

## 2023-11-29 RX ADMIN — SODIUM CHLORIDE, POTASSIUM CHLORIDE, SODIUM LACTATE AND CALCIUM CHLORIDE: 600; 310; 30; 20 INJECTION, SOLUTION INTRAVENOUS at 09:00

## 2023-11-29 RX ADMIN — Medication 200 MCG: at 10:50

## 2023-11-29 RX ADMIN — FENTANYL CITRATE 50 MCG: 50 INJECTION, SOLUTION INTRAMUSCULAR; INTRAVENOUS at 10:31

## 2023-11-29 RX ADMIN — FENTANYL CITRATE 50 MCG: 50 INJECTION, SOLUTION INTRAMUSCULAR; INTRAVENOUS at 10:38

## 2023-11-29 RX ADMIN — CEFAZOLIN 2 G: 1 INJECTION, POWDER, FOR SOLUTION INTRAMUSCULAR; INTRAVENOUS at 10:34

## 2023-11-29 RX ADMIN — DEXMEDETOMIDINE HYDROCHLORIDE 20 MCG: 100 INJECTION, SOLUTION INTRAVENOUS at 11:00

## 2023-11-29 RX ADMIN — SUGAMMADEX 200 MG: 100 INJECTION, SOLUTION INTRAVENOUS at 11:12

## 2023-11-29 RX ADMIN — DEXAMETHASONE SODIUM PHOSPHATE 8 MG: 4 INJECTION INTRA-ARTICULAR; INTRALESIONAL; INTRAMUSCULAR; INTRAVENOUS; SOFT TISSUE at 10:42

## 2023-11-29 RX ADMIN — Medication 200 MCG: at 10:55

## 2023-11-29 RX ADMIN — LIDOCAINE HYDROCHLORIDE 80 MG: 20 INJECTION, SOLUTION EPIDURAL; INFILTRATION; INTRACAUDAL at 10:34

## 2023-11-29 RX ADMIN — ONDANSETRON 4 MG: 2 INJECTION INTRAMUSCULAR; INTRAVENOUS at 11:18

## 2023-11-29 RX ADMIN — ACETAMINOPHEN 1000 MG: 500 TABLET ORAL at 08:55

## 2023-11-29 ASSESSMENT — PAIN SCALES - GENERAL: PAIN_LEVEL: 0

## 2023-11-29 ASSESSMENT — PAIN DESCRIPTION - PAIN TYPE
TYPE: ACUTE PAIN
TYPE: SURGICAL PAIN
TYPE: ACUTE PAIN
TYPE: ACUTE PAIN
TYPE: SURGICAL PAIN
TYPE: ACUTE PAIN
TYPE: SURGICAL PAIN

## 2023-11-29 NOTE — ANESTHESIA TIME REPORT
Anesthesia Start and Stop Event Times       Date Time Event    11/29/2023 1008 Ready for Procedure     1027 Anesthesia Start     1123 Anesthesia Stop          Responsible Staff  11/29/23      Name Role Begin End    Shanna Sanchez M.D. Anesth 1027 1123          Overtime Reason:  no overtime (within assigned shift)    Comments:

## 2023-11-29 NOTE — OR NURSING
Patient transported to Phase 2 on room air via gurney accompanied by RN. Vitals stable, no distress noted. Family updated, Report given to YURIY Pierce.

## 2023-11-29 NOTE — ANESTHESIA PROCEDURE NOTES
Airway    Date/Time: 11/29/2023 10:35 AM    Performed by: Shanna Sanchez M.D.  Authorized by: Shanna Sanchez M.D.    Location:  OR  Urgency:  Elective  Difficult Airway: No    Indications for Airway Management:  Anesthesia      Spontaneous Ventilation: absent    Sedation Level:  Deep  Preoxygenated: Yes    Patient Position:  Sniffing  Mask Difficulty Assessment:  1 - vent by mask  Final Airway Type:  Endotracheal airway  Final Endotracheal Airway:  ETT  Cuffed: Yes    Technique Used for Successful ETT Placement:  Direct laryngoscopy    Insertion Site:  Oral  Blade Type:  Joey  Laryngoscope Blade/Videolaryngoscope Blade Size:  3  ETT Size (mm):  7.5  Measured from:  Teeth  ETT to Teeth (cm):  23  Placement Verified by: auscultation and capnometry    Cormack-Lehane Classification:  Grade I - full view of glottis  Number of Attempts at Approach:  1

## 2023-11-29 NOTE — OR NURSING
1220 - Dr. Sanchez notified that patient is c/o of 3/10 center of chest pressure with inspiration only, pain does not radiate. MD also updated on vital signs trends since patient arrived to PACU.     1226 - MD gave telephone orders.    1232 -Patient now stating chest pain improved, would rate it as a 1/10. EKG team called and texted on Voalte for STAT 12 lead.     1243 - EKG tech acknowledged STAT order.    1300 - Pt stating chest pain no longer present.    1308 - RN reached out to EKG tech 2nd time to acknowledge STAT order.    1322 - EKG at bedside    1329 - EKG results passed along to Dr Sanchez.     1339 - Dr. Sanchez at bedside, reviewed EKG and assessed patient. Per MD, patient is still ok to transfer to Phase 2 when Phase 1 criteria are met.    1446 - Surgery team (Bharat Luong) updated on pt's c/o chest pain in PACU and interventions already completed. Per Surgery, ok for patient to transfer to Phase 2.

## 2023-11-29 NOTE — ANESTHESIA PREPROCEDURE EVALUATION
Case: [id number] Date/Time: 11/29/23 1000    Procedure: LAPAROSCOPIC POSSIBLE OPEN LEFT INGUINAL HERNIA REPAIR WITH MESH (Left: Groin)    Anesthesia type: General    Pre-op diagnosis: LEFT INGUINAL HERNIA    Location: [facility] OR 10 / SURGERY [facility]    Surgeons: Hermelindo Mcneil M.D.            Relevant Problems   CARDIAC   (positive) DVT, recurrent, lower extremity, chronic         (positive) Calculus of kidney      Other   (positive) Arthritis of carpometacarpal (CMC) joint of right thumb   (positive) Mixed hyperlipidemia   (positive) Neoplasm of uncertain behavior of bladder   (positive) Numbness and tingling in right hand   (positive) Peripheral sensory-motor axonal polyneuropathy   (positive) Right lumbosacral radiculopathy       Physical Exam    Airway   Mallampati: II  TM distance: >3 FB  Neck ROM: full       Cardiovascular - normal exam  Rhythm: regular  Rate: normal  (-) murmur     Dental - normal exam           Pulmonary - normal exam  Breath sounds clear to auscultation     Abdominal    Neurological - normal exam                   Anesthesia Plan    ASA 3   ASA physical status 3 criteria: CAD/stents (> 3 months)    Plan - general       Airway plan will be ETT          Induction: intravenous    Postoperative Plan: Postoperative administration of opioids is intended.    Pertinent diagnostic labs and testing reviewed    Informed Consent:    Anesthetic plan and risks discussed with patient.    Use of blood products discussed with: patient whom consented to blood products.

## 2023-11-29 NOTE — ANESTHESIA POSTPROCEDURE EVALUATION
Patient: Bob Schreiber    Procedure Summary       Date: 11/29/23 Room / Location: [place] [address] / SURGERY [facility]    Anesthesia Start: 1027 Anesthesia Stop: 1123    Procedure: LAPAROSCOPIC LEFT INGUINAL HERNIA REPAIR WITH MESH (Left: Groin) Diagnosis: (LEFT INGUINAL HERNIA)    Surgeons: Hermelindo Mcneil M.D. Responsible Provider: Shanna Sanchez M.D.    Anesthesia Type: general ASA Status: 3            Final Anesthesia Type: general  Last vitals  BP   Blood Pressure : 124/65    Temp   36.1 °C (97 °F)    Pulse   (!) 55   Resp   12    SpO2   93 %      Anesthesia Post Evaluation    Patient location during evaluation: PACU  Patient participation: complete - patient participated  Level of consciousness: awake and alert  Pain score: 0    Airway patency: patent  Anesthetic complications: no  Cardiovascular status: hemodynamically stable  Respiratory status: acceptable  Hydration status: euvolemic    PONV: none          No notable events documented.     Nurse Pain Score: 0 (NPRS)

## 2023-11-29 NOTE — PROGRESS NOTES
Medication history reviewed with PT at bedside    [facility] is complete per PT reporting    Allergies reviewed.     PT took 1 antibiotic for 1 dose for preprocedure on 11/20/2023. He could not recall which one. Not in chart review.     Patient is taking Eliquis 2.5mg Last dose 11/25/2023    Preferred pharmacy for this visit - [facility] ([phone]).

## 2023-11-29 NOTE — OP REPORT
NAME:  Bob Schreiber  MRN:  [id number]  :  1940      DATE OF OPERATION: 2023    PREOPERATIVE DIAGNOSIS: Recurrent left Inguinal Hernia    POSTOPERATIVE DIAGNOSIS: Recurrent left Inguinal Hernia    OPERATION PERFORMED: Laparoscopic repair of Recurrent left Inguinal Hernia with Mesh    SURGEON: Hermelindo Mcneil MD    ASSISTANT:  Bharat Luong PA-C, PA-C    ANESTHESIOLOGIST:  Anesthesiologist: Shanna Sanchez M.D.    ANESTHESIA: General endotracheal anesthesia.      SPECIMEN: None    ESTIMATED BLOOD LOSS: < 5cc.     INDICATIONS: The patient is a 83 y.o. male with a diagnosis of left groin bulge with Recurrent left inguinal hernia. He is taken to the operating room today for laparoscopic repair of inguinal hernia with mesh.     PROCEDURE: Following informed consent, the patient was properly identified, taken to the operating room, and placed in the supine position where general endotracheal anesthesia was administered. Intravenous antibiotics were administered by the anesthesiologist in the correct time interval. Sequential compression devices were employed. The abdomen was prepped and draped into a sterile field.     Although he appeared to have had both an open and prior laparoscopic bilateral inguinal hernia repair, we started with a laparoscopic technique in hopes the adhesions would not be prohibitive for a minimally invasive repair. A small infraumbilical skin incision was made and dissection was carried to the left of midline.  The anterior rectus abdominus fascia sheath was exposed and incised.  The preperitoneal plane was developed bluntly.  The Covidien dissecting trocar balloon instrument was then introduced into the preperitoneal space.  The hand pump was then used to create the preperitoneal dissection.    The balloon trocar was then removed and the CO2 insufflation and optical trocar was then advanced.  The CO2 insufflation was started and the laparoscope was introduced. This demonstrated a  nice preperitoneal dissection.  Some scarring was evident related to prior repair, but we were able to proceed safely.    Two additional 5mm trocars were then placed at midline just below the umbilicus.  Blunt dissectors were used to then dissect the left inguinal floor.  A moderately sized direct inguinal hernia was present with the hernia sac containing fat extending up through the tight defect.  The hernia sac and fat were slowly reduced.  The cord lipoma was also reduced.  The vessels and structures were carefully protected and a nice inguinal floor dissection was accomplished.    A medium lightweight plug was introduced into the direct hernia defect and secured with a securestrap tac. The left contour mesh was soaked in Kantrex solution and was trimmed so that the sizing was optimal.  The mesh was then rolled and introduced into the preperitoneal space.  It was then maneuvered into position.  The mesh was then secured to the periosteum along Yoan's ligament and the pubic ramus with the absorbable tacking instrument.  It had very nice position obliterating the hernia defect without constriction of any structures.            The mesh was then held into place with the pneumoperitoneum allowed to escape.  The port were then removed under direct vision.  The port sites were then irrigated well.  The fascia was closed with O Vicryl suture.  The port site skin incisions were closed with interrupted 4-0 Vicryl subcuticular sutures.  Steri-Strips and Benzoin were applied beneath sterile Band-Aids.     The patient tolerated the procedure well and there were no apparent complications. All sponge, needle, and instrument counts were correct on 2 separate occasions. He was awakened, extubated, and transferred to the recovery room in satisfactory condition.       ____________________________________   Hermelindo Mcneil MD  DD: 11/29/2023  2:42 PM    CC:  [facility];

## 2023-11-29 NOTE — OR NURSING
1455: Report received from YURIY Pierce. Patient to Phase II 32 pending transport. Patient provided with water. Patient tolerating PO intake.    1506: Patient to Phase II Rm 32. Lap site dressings x3 CDI. Vitals taken upon arrival. Plan of care discussed with patient. Patient belongings returned to patient at bedside.    1510: Patient and family updated regarding Plan of care.    1515: Pt discharged home. Discharge instructions given to pt prior to leaving unit including when to see PCP, and new medications if applicable. PIV removed. All questions answered. Verbalized understanding. All belongings with pt when leaving unit via foot with CNA.

## 2023-11-29 NOTE — DISCHARGE INSTRUCTIONS
HOME CARE INSTRUCTIONS    ACTIVITY: Rest and take it easy for the first 24 hours.  A responsible adult is recommended to remain with you during that time.  It is normal to feel sleepy.  We encourage you to not do anything that requires balance, judgment or coordination.    FOR 24 HOURS DO NOT:  Drive, operate machinery or run household appliances.  Drink beer or alcoholic beverages.  Make important decisions or sign legal documents.    SPECIAL INSTRUCTIONS:   Laparoscopic Inguinal Hernia Repair, Adult, Care After  The following information offers guidance on how to care for yourself after your procedure. Your health care provider may also give you more specific instructions. If you have problems or questions, contact your health care provider.  What can I expect after the procedure?  After the procedure, it is common to have:  Pain.  Swelling and bruising around the incision area.  Scrotal swelling, in males.  Some fluid or blood draining from your incisions.  Follow these instructions at home:  Medicines  Take over-the-counter and prescription medicines only as told by your health care provider.  Ask your health care provider if the medicine prescribed to you:  Requires you to avoid driving or using machinery.  Can cause constipation. You may need to take these actions to prevent or treat constipation:  Drink enough fluid to keep your urine pale yellow.  Take over-the-counter or prescription medicines.  Eat foods that are high in fiber, such as beans, whole grains, and fresh fruits and vegetables.  Limit foods that are high in fat and processed sugars, such as fried or sweet foods.  Incision care    Follow instructions from your health care provider about how to take care of your incisions. Make sure you:  Wash your hands with soap and water for at least 20 seconds before and after you change your bandage (dressing). If soap and water are not available, use hand .  Change your dressing as told by your  health care provider.  Leave stitches (sutures), skin glue, or adhesive strips in place. These skin closures may need to stay in place for 2 weeks or longer. If adhesive strip edges start to loosen and curl up, you may trim the loose edges. Do not remove adhesive strips completely unless your health care provider tells you to do that.  Check your incision area every day for signs of infection. Check for:  More redness, swelling, or pain.  More fluid or blood.  Warmth.  Pus or a bad smell.  Wear loose, soft clothing while your incisions heal.  Managing pain and swelling  If directed, put ice on the painful or swollen areas. To do this:  Put ice in a plastic bag.  Place a towel between your skin and the bag.  Leave the ice on for 20 minutes, 2-3 times a day.  Remove the ice if your skin turns bright red. This is very important. If you cannot feel pain, heat, or cold, you have a greater risk of damage to the area.    Activity  Do not lift anything that is heavier than 10 lb (4.5 kg), or the limit that you are told, until your health care provider says that it is safe.  Ask your health care provider what activities are safe for you. A lot of activity during the first week after surgery can increase pain and swelling. For 1 week after your procedure:  Avoid activities that take a lot of effort, such as exercise or sports.  You may walk and climb stairs as needed for daily activity, but avoid long walks or climbing stairs for exercise.  General instructions  If you were given a sedative during the procedure, it can affect you for several hours. Do not drive or operate machinery until your health care provider says that it is safe.  Do not take baths, swim, or use a hot tub until your health care provider approves. Ask your health care provider if you may take showers. You may only be allowed to take sponge baths.  Do not use any products that contain nicotine or tobacco. These products include cigarettes, chewing tobacco,  and vaping devices, such as e-cigarettes. If you need help quitting, ask your health care provider.  Keep all follow-up visits. This is important.  Contact a health care provider if:  You have any of these signs of infection:  More redness, swelling, or pain around your incisions or your groin area.  More fluid or blood coming from an incision.  Warmth coming from an incision.  Pus or a bad smell coming from an incision.  A fever or chills.  You have more swelling in your scrotum, if you are male.  You have severe pain and medicines do not help.  You have abdominal pain or swelling.  You cannot urinate or have a bowel movement.  You faint or feel dizzy.  You have nausea and vomiting.  Get help right away if:  You have redness, warmth, or pain in your leg.  You have chest pain.  You have problems breathing.  These symptoms may represent a serious problem that is an emergency. Do not wait to see if the symptoms will go away. Get medical help right away. Call your local emergency services (911 in the [place] Do not drive yourself to the hospital.  Summary  Pain, swelling, and bruising are common after the procedure.  Check your incision area every day for signs of infection, such as more redness, swelling, or pain.  Put ice on painful or swollen areas for 20 minutes, 2-3 times a day.  This information is not intended to replace advice given to you by your health care provider. Make sure you discuss any questions you have with your health care provider.  Document Revised: 08/17/2021 Document Reviewed: 08/17/2021  [employer] © 2023 [employer]      DIET: To avoid nausea, slowly advance diet as tolerated, avoiding spicy or greasy foods for the first day.  Add more substantial food to your diet according to your physician's instructions.  Babies can be fed formula or breast milk as soon as they are hungry.  INCREASE FLUIDS AND FIBER TO AVOID CONSTIPATION.    SURGICAL DRESSING/BATHING:   Okay to shower.  No  baths/soaking in water until cleared by DrIdalia    MEDICATIONS: Resume taking daily medication.  Take prescribed pain medication with food.  If no medication is prescribed, you may take non-aspirin pain medication if needed.  PAIN MEDICATION CAN BE VERY CONSTIPATING.  Take a stool softener or laxative such as senokot, pericolace, or milk of magnesia if needed.    Prescription given for Zofran, Roxicodone.  No pain medication given in recovery.    A follow-up appointment should be arranged with your doctor in 1-2 weeks; call to schedule.    You should CALL YOUR PHYSICIAN if you develop:  Fever greater than 101 degrees F.  Pain not relieved by medication, or persistent nausea or vomiting.  Excessive bleeding (blood soaking through dressing) or unexpected drainage from the wound.  Extreme redness or swelling around the incision site, drainage of pus or foul smelling drainage.  Inability to urinate or empty your bladder within 8 hours.  Problems with breathing or chest pain.    You should call 911 if you develop problems with breathing or chest pain.  If you are unable to contact your doctor or surgical center, you should go to the nearest emergency room or urgent care center.  Physician's telephone #: Dr. Mcneil [phone]    MILD FLU-LIKE SYMPTOMS ARE NORMAL.  YOU MAY EXPERIENCE GENERALIZED MUSCLE ACHES, THROAT IRRITATION, HEADACHE AND/OR SOME NAUSEA.    If any questions arise, call your doctor.  If your doctor is not available, please feel free to call the Surgical Center at [phone].  The Center is open Monday through Friday from 7AM to 7PM.      A registered nurse may call you a few days after your surgery to see how you are doing after your procedure.    You may also receive a survey in the mail within the next two weeks and we ask that you take a few moments to complete the survey and return it to us.  Our goal is to provide you with very good care and we value your comments.     Depression / Suicide  Risk    As you are discharged from this [facility] facility, it is important to learn how to keep safe from harming yourself.    Recognize the warning signs:  Abrupt changes in personality, positive or negative- including increase in energy   Giving away possessions  Change in eating patterns- significant weight changes-  positive or negative  Change in sleeping patterns- unable to sleep or sleeping all the time   Unwillingness or inability to communicate  Depression  Unusual sadness, discouragement and loneliness  Talk of wanting to die  Neglect of personal appearance   Rebelliousness- reckless behavior  Withdrawal from people/activities they love  Confusion- inability to concentrate     If you or a loved one observes any of these behaviors or has concerns about self-harm, here's what you can do:  Talk about it- your feelings and reasons for harming yourself  Remove any means that you might use to hurt yourself (examples: pills, rope, extension cords, firearm)  Get professional help from the community (Mental Health, Substance Abuse, psychological counseling)  Do not be alone:Call your Safe Contact- someone whom you trust who will be there for you.  Call your local CRISIS HOTLINE 787-6075 or 697-978-4330  Call your local Children's Mobile Crisis Response Team [place] (051) 030-7507 or www.[employer]aldrive  Call the toll free National Suicide Prevention Hotlines   National Suicide Prevention Lifeline 655-410-WUHU (8742)  National Hope Line Network 800-SUICIDE (407-9319)    I acknowledge receipt and understanding of these Home Care instructions.

## (undated) DEVICE — CANNULA W/SEAL 5X100 Z-THRE - ADED KII (12/BX)

## (undated) DEVICE — GLOVE BIOGEL PI INDICATOR SZ 7.5 SURGICAL PF LF -(50/BX 4BX/CA)

## (undated) DEVICE — TUBING CLEARLINK DUO-VENT - C-FLO (48EA/CA)

## (undated) DEVICE — DEVICE 5MM ABSRB STRAP FIXATION  (6EA/BX)

## (undated) DEVICE — DRAPESURG STERI-DRAPE LONG - (10/BX 4BX/CA)

## (undated) DEVICE — PACK LAP CHOLE OR - (2EA/CA)

## (undated) DEVICE — SUTURE 0 VICRYL PLUS UR-6 - 27 INCH (36/BX)

## (undated) DEVICE — SET LEADWIRE 5 LEAD BEDSIDE DISPOSABLE ECG (1SET OF 5/EA)

## (undated) DEVICE — COVER LIGHT HANDLE ALC PLUS DISP (18EA/BX)

## (undated) DEVICE — LACTATED RINGERS INJ 1000 ML - (14EA/CA 60CA/PF)

## (undated) DEVICE — SCISSORS 5MM CVD (6EA/BX)

## (undated) DEVICE — CANISTER SUCTION 3000ML MECHANICAL FILTER AUTO SHUTOFF MEDI-VAC NONSTERILE LF DISP  (40EA/CA)

## (undated) DEVICE — STERI STRIP COMPOUND BENZOIN - TINCTURE 0.6ML WITH APPLICATOR (40EA/BX)

## (undated) DEVICE — SUCTION INSTRUMENT YANKAUER BULBOUS TIP W/O VENT (50EA/CA)

## (undated) DEVICE — SET TUBING PNEUMOCLEAR HIGH FLOW SMOKE EVACUATION (10EA/BX)

## (undated) DEVICE — SLEEVE, VASO, THIGH, MED

## (undated) DEVICE — TROCAR LAPSCP 100MM 12MM NTHRD - (6/BX)

## (undated) DEVICE — NEPTUNE 4 PORT MANIFOLD - (20/PK)

## (undated) DEVICE — PROTECTOR ULNA NERVE - (36PR/CA)

## (undated) DEVICE — CHLORAPREP 26 ML APPLICATOR - ORANGE TINT(25/CA)

## (undated) DEVICE — SENSOR OXIMETER ADULT SPO2 RD SET (20EA/BX)

## (undated) DEVICE — SUTURE GENERAL

## (undated) DEVICE — TOWEL STOP TIMEOUT SAFETY FLAG (40EA/CA)

## (undated) DEVICE — SET EXTENSION WITH 2 PORTS (48EA/CA) ***PART #2C8610 IS A SUBSTITUTE*****

## (undated) DEVICE — SODIUM CHL IRRIGATION 0.9% 1000ML (12EA/CA)

## (undated) DEVICE — KIT ANESTHESIA W/CIRCUIT & 3/LT BAG W/FILTER (20EA/CA)

## (undated) DEVICE — HEAD HOLDER JUNIOR/ADULT

## (undated) DEVICE — MASK ANESTHESIA ADULT  - (100/CA)

## (undated) DEVICE — SUTURE 4-0 VICRYL PLUSFS-1 - 27 INCH (36/BX)

## (undated) DEVICE — GOWN WARMING STANDARD FLEX - (30/CA)

## (undated) DEVICE — SYSTEM DISSECTION BALLOON  KII ROUND WITH 2 EACH 5MM LOW PROFILE TROCARS (3EA/BX)

## (undated) DEVICE — ELECTRODE DUAL RETURN W/ CORD - (50/PK)

## (undated) DEVICE — TROCAR 5X100 NON BLADED Z-TH - READ KII (6/BX)

## (undated) DEVICE — BANDAID SHEER STRIP 3/4 IN (100EA/BX 12BX/CA)

## (undated) DEVICE — ELECTRODE 850 FOAM ADHESIVE - HYDROGEL RADIOTRNSPRNT (50/PK)

## (undated) DEVICE — SYSTEM DISSECTION BALLOON  KII ROUND WITH 2 EACH 5MM LOW PROFILE TROCARS

## (undated) DEVICE — GLOVE SZ 6 BIOGEL PI MICRO - PF LF (50PR/BX 4BX/CA)

## (undated) DEVICE — CLOSURE SKIN STRIP 1/2 X 4 IN - (STERI STRIP) (50/BX 4BX/CA)

## (undated) DEVICE — SENSOR SPO2 NEO LNCS ADHESIVE (20/BX) SEE USER NOTES

## (undated) DEVICE — GLOVE BIOGEL PI INDICATOR SZ 6.5 SURGICAL PF LF - (50/BX 4BX/CA)

## (undated) DEVICE — GLOVE SZ 7 BIOGEL PI MICRO - PF LF (50PR/BX 4BX/CA)